# Patient Record
Sex: MALE | URBAN - METROPOLITAN AREA
[De-identification: names, ages, dates, MRNs, and addresses within clinical notes are randomized per-mention and may not be internally consistent; named-entity substitution may affect disease eponyms.]

---

## 2019-10-23 ENCOUNTER — HOSPITAL ENCOUNTER (OUTPATIENT)
Dept: BEHAVIORAL HEALTH | Facility: CLINIC | Age: 34
Discharge: HOME | End: 2019-10-23
Attending: SOCIAL WORKER | Admitting: SOCIAL WORKER
Payer: MEDICAID

## 2019-10-23 VITALS — HEIGHT: 76 IN | BODY MASS INDEX: 24.96 KG/M2 | WEIGHT: 205 LBS

## 2019-10-23 PROCEDURE — H0001 ALCOHOL AND/OR DRUG ASSESS: HCPCS | Mod: HF | Performed by: COUNSELOR

## 2019-10-23 RX ORDER — DULOXETIN HYDROCHLORIDE 30 MG/1
30 CAPSULE, DELAYED RELEASE ORAL 2 TIMES DAILY
COMMUNITY

## 2019-10-23 SDOH — HEALTH STABILITY: MENTAL HEALTH: HOW OFTEN DO YOU HAVE A DRINK CONTAINING ALCOHOL?: 2-4 TIMES A MONTH

## 2019-10-23 SDOH — HEALTH STABILITY: MENTAL HEALTH: HOW MANY STANDARD DRINKS CONTAINING ALCOHOL DO YOU HAVE ON A TYPICAL DAY?: 1 OR 2

## 2019-10-23 SDOH — HEALTH STABILITY: MENTAL HEALTH: HOW OFTEN DO YOU HAVE 6 OR MORE DRINKS ON ONE OCCASION?: NEVER

## 2019-10-23 ASSESSMENT — ANXIETY QUESTIONNAIRES
IF YOU CHECKED OFF ANY PROBLEMS ON THIS QUESTIONNAIRE, HOW DIFFICULT HAVE THESE PROBLEMS MADE IT FOR YOU TO DO YOUR WORK, TAKE CARE OF THINGS AT HOME, OR GET ALONG WITH OTHER PEOPLE: NOT DIFFICULT AT ALL
2. NOT BEING ABLE TO STOP OR CONTROL WORRYING: SEVERAL DAYS
5. BEING SO RESTLESS THAT IT IS HARD TO SIT STILL: NOT AT ALL
4. TROUBLE RELAXING: NOT AT ALL
1. FEELING NERVOUS, ANXIOUS, OR ON EDGE: SEVERAL DAYS
7. FEELING AFRAID AS IF SOMETHING AWFUL MIGHT HAPPEN: NOT AT ALL
3. WORRYING TOO MUCH ABOUT DIFFERENT THINGS: NOT AT ALL
6. BECOMING EASILY ANNOYED OR IRRITABLE: NOT AT ALL
GAD7 TOTAL SCORE: 2

## 2019-10-23 ASSESSMENT — PATIENT HEALTH QUESTIONNAIRE - PHQ9: SUM OF ALL RESPONSES TO PHQ QUESTIONS 1-9: 0

## 2019-10-23 ASSESSMENT — MIFFLIN-ST. JEOR: SCORE: 1966.37

## 2019-10-23 ASSESSMENT — PAIN SCALES - GENERAL: PAINLEVEL: NO PAIN (0)

## 2019-10-23 NOTE — PROGRESS NOTES
"Rule 25 Assessment  Background Information   1. Date of Assessment Request  2. Date of Assessment  10/23/2019 3. Date Service Authorized     4.   [employer]   5.  Phone Number   [phone] 6. Referent  Self 7. Assessment Site  [facility]     8. Client Name   Raf Bass 9. Date of Birth  1985 Age  34 year old 10. Gender  male  11. PMI/ Insurance No.  [id number]   12. Client's Primary Language:  English 13. Do you require special accommodations, such as an  or assistance with written material? No   14. Current Address: [address] [place] [id number]   15. Client Phone Numbers: [phone] (home)      16. Tell me what has happened to bring you here today.     \"Well, I was at a [employer] party, having fun, probably drink a littlte too much that day. I had a DWI in October 2018, I am trying to get a chemical assessment done and then follow through with recommmendations in order to get my license reinstated.\"    17. Have you had other rule 25 assessments?     No    DIMENSION I - Acute Intoxication /Withdrawal Potential   1. Chemical use most recent 12 months outside a facility and other significant use history (client self-report)              X = Primary Drug Used   Age of First Use Most Recent Pattern of Use and Duration   Need enough information to show pattern (both frequency and amounts) and to show tolerance for each chemical that has a diagnosis   Date of last use and time, if needed   Withdrawal Potential? Requiring special care Method of use  (oral, smoked, snort, IV, etc)      Alcohol     17 Age 17-22: drinks 2-6 beers sporadically, high school parties  HU-34 which occurred during the day of his incident where he drinks a lot shots. Admits to social drinking throughout his life.   2 months ago no oral      Marijuana/  Hashish   17 Experimented marijuana a few times 10 years ago no smoke      Cocaine/Crack     No use          " Meth/  Amphetamines   No use          Heroin     No use          Other Opiates/  Synthetics   No use          Inhalants     No use          Benzodiazepines     No use          Hallucinogens     No use          Barbiturates/  Sedatives/  Hypnotics No use          Over-the-Counter Drugs   No use          Other     No use          Nicotine     No use         2. Do you use greater amounts of alcohol/other drugs to feel intoxicated or achieve the desired effect?  No.  Or use the same amount and get less of an effect?  No.  Example: The patient denied having any history of tolerance with alcohol and/or drugs.    3A. Have you ever been to detox?     No    3B. When was the first time?     The patient denied ever having a detoxification admission.    3C. How many times since then?     The patient denied ever having a detoxification admission.    3D. Date of most recent detox:     The patient denied ever having a detoxification admission.    4.  Withdrawal symptoms: Have you had any of the following withdrawal symptoms?  Past 12 months Recent (past 30 days)   None None     's Visual Observations and Symptoms: No visible withdrawal symptoms at this time    Based on the above information, is withdrawal likely to require attention as part of treatment participation?  No    Dimension I Ratings   Acute intoxication/Withdrawal potential - The placing authority must use the criteria in Dimension I to determine a client s acute intoxication and withdrawal potential.    RISK DESCRIPTIONS - Severity ratin Client displays full functioning with good ability to tolerate and cope with withdrawal discomfort. No signs or symptoms of intoxication or withdrawal or resolving signs or symptoms.    REASONS SEVERITY WAS ASSIGNED (What about the amount of the person s use and date of most recent use and history of withdrawal problems suggests the potential of withdrawal symptoms requiring professional assistance? )     No no signs or  symptoms of withdrawal or intoxication observed during the interview.         DIMENSION II - Biomedical Complications and Conditions   1a. Do you have any current health/medical conditions?(Include any infectious diseases, allergies, or chronic or acute pain, history of chronic conditions)       Yes.   Illnesses/Medical Conditions you are receiving care for: Asthma at very young age being controlled by medications.    1b. On a scale of mild, moderate to severe please specify the severity of the patient's diabetes and/or neuropathy.    The patient denied having a history of being diagnosed with diabetes or neuropathy.    2. Do you have a health care provider? When was your most recent appointment? What concerns were identified?     The patient does not have a PCP at this time.    3. If indicated by answers to items 1 or 2: How do you deal with these concerns? Is that working for you? If you are not receiving care for this problem, why not?      The patient reported taking prescription medications as prescribed for the above medical issues.    4A. List current medication(s) including over-the-counter or herbal supplements--including pain management:   DULoxetine (CYMBALTA) 30 MG capsule  Advair daily for his Asthmatic     4B. Do you follow current medical recommendations/take medications as prescribed?     Yes    4C. When did you last take your medication?     This morning.    4D. Do you need a referral to have a follow up with a primary care physician?    No.    5. Has a health care provider/healer ever recommended that you reduce or quit alcohol/drug use?     No    6. Are you pregnant?     NA, because the patient is male    7. Have you had any injuries, assaults/violence towards you, accidents, health related issues, overdose(s) or hospitalizations related to your use of alcohol or other drugs:     No    8. Do you have any specific physical needs/accommodations? No    Dimension II Ratings   Biomedical Conditions and  "Complications - The placing authority must use the criteria in Dimension II to determine a client s biomedical conditions and complications.   RISK DESCRIPTIONS - Severity ratin Client displays full functioning with good ability to cope with physical discomfort.    REASONS SEVERITY WAS ASSIGNED (What physical/medical problems does this person have that would inhibit his or her ability to participate in treatment? What issues does he or she have that require assistance to address?)    Patient reports being in good health and is medicated for his asthmatic and able to access medical services as needed.         DIMENSION III - Emotional, Behavioral, Cognitive Conditions and Complications   1. (Optional) Tell me what it was like growing up in your family. (substance use, mental health, discipline, abuse, support)     \"I grew up in [place], have good two loving parents, two brothers in very stable home. Denies any MICD or abuse or discipline issues reported in the home.\"    2. When was the last time that you had significant problems...  A. with feeling very trapped, lonely, sad, blue, depressed or hopeless  about the future? 1+ years ago    B. with sleep trouble, such as bad dreams, sleeping restlessly, or falling  asleep during the day? Never    C. with feeling very anxious, nervous, tense, scared, panicked, or like  something bad was going to happen? 1+ years ago    D. with becoming very distressed and upset when something reminded  you of the past? 1+ years ago    E. with thinking about ending your life or committing suicide? Never    3. When was the last time that you did the following things two or more times?  A. Lied or conned to get things you wanted or to avoid having to do  something? Never    B. Had a hard time paying attention at school, work, or home? Never    C. Had a hard time listening to instructions at school, work, or home? Never    D. Were a bully or threatened other people? Never    E. Started " physical fights with other people? Never    Note: These questions are from the Global Appraisal of Individual Needs--Short Screener. Any item marked  past month  or  2 to 12 months ago  will be scored with a severity rating of at least 2.     For each item that has occurred in the past month or past year ask follow up questions to determine how often the person has felt this way or has the behavior occurred? How recently? How has it affected their daily living? And, whether they were using or in withdrawal at the time?    The patient did not identify as having any of the above items in the past month.    4A. If the person has answered item 2E with  in the past year  or  the past month , ask about frequency and history of suicide in the family or someone close and whether they were under the influence.     The patient denied any family member or someone close to the patient had ever completed suicide.    Any history of suicide in your family? Or someone close to you?     The patient denied any family member or someone close to the patient had ever completed suicide.    4B. If the person answered item 2E  in the past month  ask about  intent, plan, means and access and any other follow-up information  to determine imminent risk. Document any actions taken to intervene  on any identified imminent risk.      The patient denied having any suicide ideation within the past month.    5A. Have you ever been diagnosed with a mental health problem?     Yes, explain: depression at age 32, I don't have a therapist currently.      5B. Are you receiving care for any mental health issues? If yes, what is the focus of that care or treatment?  Are you satisfied with the service? Most recent appointment?  How has it been helpful?     The patient reported having prior treatment for mental health issues, but denied receiving any current treatment for mental health issues.    6. Have you been prescribed medications for  emotional/psychological problems?     Yes,   Current Outpatient Medications   Medication     DULoxetine (CYMBALTA) 30 MG capsule     No current facility-administered medications for this encounter.        7. Does your MH provider know about your use?     Yes.  7B. What does he or she have to say about it?(DSM) No concerns reeported.    8A. Have you ever been verbally, emotionally, physically or sexually abused?      The patient denied having any history of being verbally, emotionally, physically or sexually abused.     Follow up questions to learn current risk, continuing emotional impact.      The patient denied having any history of being verbally, emotionally, physically or sexually abused.    8B. Have you received counseling for abuse?      The patient denied having any history of being verbally, emotionally, physically or sexually abused.    9. Have you ever experienced or been part of a group that experienced community violence, historical trauma, rape or assault?     No    10A. Star:    No    11. Do you have problems with any of the following things in your daily life?    No      Note: If the person has any of the above problems, follow up with items 12, 13, and 14. If none of the issues in item 11 are a problem for the person, skip to item 15.    The patient denied having any history of having problems with headaches, dizziness, problem solving, concentration, performing job/school work, remembering, in relationships with others, reading, writing, calculation, fights, being fired or arrests in his daily life.    12. Have you been diagnosed with traumatic brain injury or Alzheimer s?  No    13. If the answer to #12 is no, ask the following questions:    Have you ever hit your head or been hit on the head? No    Were you ever seen in the Emergency Room, hospital or by a doctor because of an injury to your head? No    Have you had any significant illness that affected your brain (brain tumor, meningitis,  West Nile Virus, stroke or seizure, heart attack, near drowning or near suffocation)? No    14. If the answer to #12 is yes, ask if any of the problems identified in #11 occurred since the head injury or loss of oxygen. No    15A. Highest grade of school completed:     College graduate    15B. Do you have a learning disability? No    15C. Did you ever have tutoring in Math or English? No    15D. Have you ever been diagnosed with Fetal Alcohol Effects or Fetal Alcohol Syndrome? No    16. If yes to item 15 B, C, or D: How has this affected your use or been affected by your use?     No    Dimension III Ratings   Emotional/Behavioral/Cognitive - The placing authority must use the criteria in Dimension III to determine a client s emotional, behavioral, and cognitive conditions and complications.   RISK DESCRIPTIONS - Severity ratin Client has impulse control and coping skills. Client presents a mild to moderate risk of harm to self or others or displays symptoms of emotional, behavioral or cognitive problems. Client has a mental health diagnosis and is stable. Client functions adequately in significant life areas.    REASONS SEVERITY WAS ASSIGNED - What current issues might with thinking, feelings or behavior pose barriers to participation in a treatment program? What coping skills or other assets does the person have to offset those issues? Are these problems that can be initially accommodated by a treatment provider? If not, what specialized skills or attributes must a provider have?    Patient reports mental health diagnosis for depression, is currently taking medication to manage his mental health symptoms. Patient PHQ9 reveals no depression and hid GAD7 indicates minimal anxiety. He struggles with impulse control in social gathering. No MICD indicated growing up in the family. Client denies any past suicidal ideations or intention to harm himself or others at this time.         DIMENSION IV - Readiness for  "Change   1. You ve told me what brought you here today. (first section) What do you think the problem really is?     \"I broke the law and now suffering the consequences of my actions, and now I am trying to follow up with my repsonsibility.\"    2. Tell me how things are going. Ask enough questions to determine whether the person has use related problems or assets that can be built upon in the following areas: Family/friends/relationships; Legal; Financial; Emotional; Educational; Recreational/ leisure; Vocational/employment; Living arrangements (DSM)       \"Good and stable family and friends relationships, legal:DWI, and unsupervised probation at [facility], financially doing okay, starts a new job on Monday at [employer] in [place], College graduate, living with his girlfiend in [place], stable emotionally, his leisure are basketball, golf, movies, eating out with significant other.\"    3. What activities have you engaged in when using alcohol/other drugs that could be hazardous to you or others (i.e. driving a car/motorcycle/boat, operating machinery, unsafe sex, sharing needles for drugs or tattoos, etc     The patient reported having a history of driving while under the influence of alcohol or drugs.    4. How much time do you spend getting, using or getting over using alcohol or drugs? (DSM)      \"I guess drinking has never been an issue.\"    5. Reasons for drinking/drug use (Use the space below to record answers. It may not be necessary to ask each item.)  Like the feeling Yes   Trying to forget problems No   To cope with stress No   To relieve physical pain No   To cope with anxiety No   To cope with depression No   To relax or unwind Yes   Makes it easier to talk with people No   Partner encourages use No   Most friends drink or use No   To cope with family problems No   Afraid of withdrawal symptoms/to feel better No   Other (specify)  No     A. What concerns other people about your alcohol or " "drug use/Has anyone told you that you use too much? What did they say? (DSM)      \"Patient reports that no one has ever had any concerns regarding his drinking in the past, nope.\"    B. What did you think about that/ do you think you have a problem with alcohol or drug use?      \"Nope, I don't have a problem\"    6. What changes are you willing to make? What substance are you willing to stop using? How are you going to do that? Have you tried that before? What interfered with your success with that goal?       \"Yeah, I am willing to stop drinking, through my support system, my girlfriend and family. I guess it's not I feel like I have an issue, I have had months where I don't drink, I don't have the need to go drink\"    7. What would be helpful to you in making this change?      \"I guess support from girlfriend and family\"    Dimension IV Ratings   Readiness for Change - The placing authority must use the criteria in Dimension IV to determine a client s readiness for change.   RISK DESCRIPTIONS - Severity ratin Client is motivated with active reinforcement, to explore treatment and strategies for change, but ambivalent about illness or need for change.    REASONS SEVERITY WAS ASSIGNED - (What information did the person provide that supports your assessment of his or her readiness to change? How aware is the person of problems caused by continued use? How willing is she or he to make changes? What does the person feel would be helpful? What has the person been able to do without help?)      Pt presents in the contemplative stage of change.  Pt reports desire to quit drinking for the sake of his family and probation.          DIMENSION V - Relapse, Continued Use, and Continued Problem Potential   1A. In what ways have you tried to control, cut-down or quit your use? If you have had periods of sobriety, how did you accomplish that? What was helpful? What happened to prevent you from continuing your sobriety? (DSM) " "     \"No, I don't feel like I drink too much or have he need to cut down or control it, i had approximately 6 months without drinking. I was very busy working, I guess I had some social gathering, alcohol was involved.\"    1B. What were the circumstances of your most recent relapse with mood altering chemicals?     \" Social gathering\"    2. Have you experienced cravings? If yes, ask follow up questions to determine if the person recognizes triggers and if the person has had any success in dealing with them.     The patient denied having any current cravings to use mood altering chemicals.    3. Have you been treated for alcohol/other drug abuse/dependence? No    4. Support group participation: Have you/do you attend support group meetings to reduce/stop your alcohol/drug use? How recently? What was your experience? Are you willing to restart? If the person has not participated, is he or she willing?     \"No, I have not had any affiliation with any self-help groups.    5. What would assist you in staying sober/straight?      \"I guess family and girlfriend's support.\"    Dimension V Ratings   Relapse/Continued Use/Continued problem potential - The placing authority must use the criteria in Dimension V to determine a client s relapse, continued use, and continued problem potential.   RISK DESCRIPTIONS - Severity ratin Client recognizes relapse issues and prevention strategies, but displays some vulnerability for further substance use or mental health problems.    REASONS SEVERITY WAS ASSIGNED - (What information did the person provide that indicates his or her understanding of relapse issues? What about the person s experience indicates how prone he or she is to relapse? What coping skills does the person have that decrease relapse potential?)      Patient reports limited understanding of relapse prevention, and may benefit from self-help groups as coping skills to arrest his use         DIMENSION VI - Recovery " "Environment   1. Are you employed/attending school? Tell me about that.      \"Starting a new job at [employer] in [place], college graduate.\"    2A. Describe a typical day; evening for you. Work, school, social, leisure, volunteer, spiritual practices. Include time spent obtaining, using, recovering from drugs or alcohol. (DSM)     \"I guess, I work from 8-5, and then go home and have dinner with my gf and watch movies.\"    Please describe what leisure activities have been associated with your substance abuse:      \"I guess a social gathering, parties and watching sports.\"    2B. How often do you spend more time than you planned using or use more than you planned? (DSM)     \"I guess never\"    3. How important is using to your social connections? Do many of your family or friends use?     \" Mildly important, yeah family and friends.\"    4A. Are you currently in a significant relationship?     Yes.  4B. How long? 1 year            Please describe your significant other's use of mood altering chemicals? She does not now because she is pregnant but drinks socially prior to the pregnancy.    4C. Sexual Orientation:     Heterosexual    5A. Who do you live with?      Patient reports that she resides home with his girlfriend in [place].    5B. Tell me about their alcohol/drug use and mental health issues.     \"She does not have any alcohol/drugs or mental health issues.\"    5C. Are you concerned for your safety there? No    5D. Are you concerned about the safety of anyone else who lives with you? No    6A. Do you have children who live with you?     No    6B. Do you have children who do not live with you?     No    7A. Who supports you in making changes in your alcohol or drug use? What are they willing to do to support you? Who is upset or angry about you making changes in your alcohol or drug use? How big a problem is this for you?       \"Family, friends and girlfriend are all supportive, no one is upset.\"    7B. This " "table is provided to record information about the person s relationships and available support It is not necessary to ask each item; only to get a comprehensive picture of their support system.  How often can you count on the following people when you need someone?   Partner / Spouse Always supportive   Parent(s)/Aunt(s)/Uncle(s)/Grandparents Always supportive   Sibling(s)/Cousin(s) Always supportive   Child(radha) The patient doesn't have any children.   Other relative(s) Always supportive   Friend(s)/neighbor(s) Usually supportive   Child(radha) s father(s)/mother(s) The patient doesn't have any children.   Support group member(s) The patient denied having any current involvement with 12-step or other support group meetings.   Community of radha members The patient denied having any current involvement with community radha members.   /counselor/therapist/healer The patient denied having any current involvement with a , counselor, therapist or healer.   Other (specify) No     8A. What is your current living situation?     Patient reports that she resides home with his girlfriend in [place].      8B. What is your long term plan for where you will be living?     * \"Planning in [place] for the foreseeable future.\"    8C. Tell me about your living environment/neighborhood? Ask enough follow up questions to determine safety, criminal activity, availability of alcohol and drugs, supportive or antagonistic to the person making changes.       Safe and comfortable living environment, great neighborhood, good neighbors, no crimes.    9. Criminal justice history: Gather current/recent history and any significant history related to substance use--Arrests? Convictions? Circumstances? Alcohol or drug involvement? Sentences? Still on probation or parole? Expectations of the court? Current court order? Any sex offenses - lifetime? What level? (DSM)    DWI in 2018    10. What obstacles exist to participating in " "treatment? (Time off work, childcare, funding, transportation, pending FDC time, living situation)     \"work and having a baby on the way\"    Dimension VI Ratings   Recovery environment - The placing authority must use the criteria in Dimension VI to determine a client s recovery environment.   RISK DESCRIPTIONS - Severity ratin Client has passive social  or family and significant other are not interested in the client's recovery. The client is engaged in structured meaningful activity.    REASONS SEVERITY WAS ASSIGNED - (What support does the person have for making changes? What structure/stability does the person have in his or her daily life that will increase the likelihood that changes can be sustained? What problems exist in the person s environment that will jeopardize getting/staying clean and sober?)     Patient resides with his significant other, college graduate, starting a new job. Family and girlfriend are supportive. Patient has current legal issues.          Client Choice/Exceptions   Would you like services specific to language, age, gender, culture, Jain preference, race, ethnicity, sexual orientation or disability?  No    What particular treatment choices and options would you like to have? None    Do you have a preference for a particular treatment program? None    Criteria for Diagnosis     Criteria for Diagnosis  DSM-5 Criteria for Substance Use Disorder  Instructions: Determine whether the client currently meets the criteria for Substance Use Disorder using the diagnostic criteria in the DSM-V pp.481-589. Current means during the most recent 12 months outside a facility that controls access to substances    Category of Substance Severity (ICD-10 Code / DSM 5 Code)     Alcohol Use Disorder The patient does not meet the criteria for an Alcohol use disorder.   Cannabis Use Disorder The patient does not meet the criteria for a Cannabis use disorder.   Hallucinogen Use " Disorder The patient does not meet the criteria for a Hallucinogen use disorder.   Inhalant Use Disorder The patient does not meet the criteria for an Inhalant use disorder.   Opioid Use Disorder The patient does not meet the criteria for an Opioid use disorder.   Sedative, Hypnotic, or Anxiolytic Use Disorder The patient does not meet the criteria for a Sedative/Hypnotic use disorder.   Stimulant Related Disorder The patient does not meet the criteria for a Stimulant use disorder.   Tobacco Use Disorder The patient does not meet the criteria for a Tobacco use disorder.   Other (or unknown) Substance Use Disorder The patient does not meet the criteria for a Other (or unknown) Substance use disorder.       Collateral Contact Summary   Number of contacts made: 2    Contact with referring person:  Yes    If court related records were reviewed, summarize here: No court records had been reviewed at the time of this documentation.    Information from collateral contacts supported/largely agreed with information from the client and associated risk ratings.      Rule 25 Assessment Summary and Plan   's Recommendation    1)  The patient does not meet the criteria for a Substance use disorder.There were no recommendations for this patient to enter a substance abuse treatment program.  2)  Abstain from all mood-altering chemicals unless prescribed by a licensed provider.   3)  Attend one weekly sober support group meetings for two months.     4)  Remain law abiding and follow all recommendations of the Courts.    Collateral Contacts     Name:    Alba Martinez   Relationship:    Girlfiriend   Phone Number:    [phone] Releases:    Yes     Writer spoke to patient's girlfriend, she reports that she has no concern regarding Raf alcohol problem. She states that they are pretty happy together, expecting a baby and currently looking at apartments.       Collateral Contacts     Name:    Khushboo Bass    Relationship:    Mother   Phone Number:    [phone]   Releases:    Yes     Writer spoke patient's mother today. She reports that she has had concerns in the past about Raf, noticed that he has made some stride in sobriety, would like to be involved AA meetings because it would not hurt him. She reports that she is seeing growth moving forward and that he is moving in the right direction and is proud of him starting a new job and family.     ollateral Contacts      A problematic pattern of alcohol/drug use leading to clinically significant impairment or distress, as manifested by at least two of the following, occurring within a 12-month period:    6.) Continued alcohol use despite having persistent or recurrent social or interpersonal problems caused or exacerbated by the effects of alcohol/drug.      Specify if: In early remission:  After full criteria for alcohol/drug use disorder were previously met, none of the criteria for alcohol/drug use disorder have been met for at least 3 months but for less than 12 months (with the exception that Criterion A4,  Craving or a strong desire or urge to use alcohol/drug  may be met).     In sustained remission:   After full criteria for alcohol use disorder were previously met, non of the criteria for alcohol/drug use disorder have been met at any time during a period of 12 months or longer (with the exception that Criterion A4,  Craving or strong desire or urge to use alcohol/drug  may be met).   Specify if:   This additional specifier is used if the individual is in an environment where access to alcohol is restricted.    Mild: Presence of 2-3 symptoms  Moderate: Presence of 4-5 symptoms  Severe: Presence of 6 or more symptoms
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
## 2019-10-24 ASSESSMENT — ANXIETY QUESTIONNAIRES: GAD7 TOTAL SCORE: 2
